# Patient Record
Sex: FEMALE | Race: WHITE | NOT HISPANIC OR LATINO | URBAN - METROPOLITAN AREA
[De-identification: names, ages, dates, MRNs, and addresses within clinical notes are randomized per-mention and may not be internally consistent; named-entity substitution may affect disease eponyms.]

---

## 2017-12-24 ENCOUNTER — HOSPITAL ENCOUNTER (EMERGENCY)
Facility: OTHER | Age: 67
Discharge: HOME | End: 2017-12-24
Attending: EMERGENCY MEDICINE
Payer: MEDICARE

## 2017-12-24 VITALS
HEIGHT: 64 IN | BODY MASS INDEX: 28.17 KG/M2 | OXYGEN SATURATION: 99 % | SYSTOLIC BLOOD PRESSURE: 145 MMHG | WEIGHT: 165 LBS | TEMPERATURE: 98 F | DIASTOLIC BLOOD PRESSURE: 74 MMHG | RESPIRATION RATE: 18 BRPM | HEART RATE: 75 BPM

## 2017-12-24 DIAGNOSIS — S05.01XA INJ CONJUNCTIVA AND CORNEAL ABRASION W/O FB, RIGHT EYE, INIT: Primary | ICD-10-CM

## 2017-12-24 PROCEDURE — 99283 EMERGENCY DEPT VISIT LOW MDM: CPT

## 2017-12-24 PROCEDURE — 25000003 PHARM REV CODE 250: Performed by: EMERGENCY MEDICINE

## 2017-12-24 RX ORDER — PROPARACAINE HYDROCHLORIDE 5 MG/ML
1 SOLUTION/ DROPS OPHTHALMIC
Status: COMPLETED | OUTPATIENT
Start: 2017-12-24 | End: 2017-12-24

## 2017-12-24 RX ORDER — MOXIFLOXACIN 5 MG/ML
1 SOLUTION/ DROPS OPHTHALMIC
Status: COMPLETED | OUTPATIENT
Start: 2017-12-24 | End: 2017-12-24

## 2017-12-24 RX ORDER — MOXIFLOXACIN 5 MG/ML
1 SOLUTION/ DROPS OPHTHALMIC 3 TIMES DAILY
Qty: 1.4 ML | Refills: 0 | Status: SHIPPED | OUTPATIENT
Start: 2017-12-24 | End: 2017-12-31

## 2017-12-24 RX ADMIN — MOXIFLOXACIN HYDROCHLORIDE 1 DROP: 5 SOLUTION/ DROPS OPHTHALMIC at 08:12

## 2017-12-24 RX ADMIN — FLUORESCEIN SODIUM 1 STRIP: 1 STRIP OPHTHALMIC at 06:12

## 2017-12-24 RX ADMIN — PROPARACAINE HYDROCHLORIDE 1 DROP: 5 SOLUTION/ DROPS OPHTHALMIC at 06:12

## 2017-12-25 NOTE — ED PROVIDER NOTES
Encounter Date: 12/24/2017       History     Chief Complaint   Patient presents with    Eye Pain     reports R eye pain since yesterday, pt reports feeling like something was in her eye and was unable to get it out.     History of cataract repair and glaucoma treated with shunt.         Eye Pain    This is a new problem. The current episode started yesterday. The problem occurs constantly. The problem has been unchanged. The right eye is affected. The injury mechanism is unknown. There is no history of trauma to the eye. There is no known exposure to pink eye. She does not wear contacts. Associated symptoms include foreign body sensation. Pertinent negatives include no numbness, no blurred vision, no decreased vision, no discharge, no photophobia, no eye redness, no nausea, no vomiting and no weakness. She has tried water for the symptoms. The treatment provided no relief.   Visiting from [facility]. Will follow up with her PCP when she returns home to check tetanus status. Refused tetanus booster today because she suspects she is current. Will also follow up with her eye doctor on Tuesday for repeat eye exam.   Review of patient's allergies indicates:   Allergen Reactions    Codeine      History reviewed. No pertinent past medical history.  Past Surgical History:   Procedure Laterality Date    CATARACT EXTRACTION       History reviewed. No pertinent family history.  Social History   Substance Use Topics    Smoking status: Never Smoker    Smokeless tobacco: Never Used    Alcohol use No     Review of Systems   Eyes: Positive for pain. Negative for blurred vision, photophobia, discharge, redness, itching and visual disturbance.   Gastrointestinal: Negative for nausea and vomiting.   Neurological: Negative for weakness and numbness.   All other systems reviewed and are negative.      Physical Exam     Initial Vitals [12/24/17 1735]   BP Pulse Resp Temp SpO2   (!) 152/77 73 20 98.4 °F (36.9 °C) 97 %       MAP       102         Physical Exam    Nursing note and vitals reviewed.  Constitutional: She appears well-developed and well-nourished. She is not diaphoretic. No distress.   HENT:   Head: Normocephalic and atraumatic.   Eyes: Conjunctivae are normal. Pupils are equal, round, and reactive to light. Right eye exhibits no chemosis, no discharge and no exudate. No foreign body present in the right eye. Left eye exhibits no chemosis, no discharge and no exudate. No foreign body present in the left eye. Right conjunctiva is not injected. Right conjunctiva has no hemorrhage. Left conjunctiva is not injected. Left conjunctiva has no hemorrhage. Right eye exhibits normal extraocular motion and no nystagmus. Left eye exhibits normal extraocular motion and no nystagmus.   Slit lamp exam:       The right eye shows corneal abrasion and fluorescein uptake. The right eye shows no foreign body and no anterior chamber bulge.        The left eye shows no corneal abrasion, no foreign body, no fluorescein uptake and no anterior chamber bulge.       Right eye pressure 13  Left eye pressure 19    VA 20/30 right and 20/20 left   Neck: Normal range of motion. Neck supple.   Cardiovascular: Normal rate and intact distal pulses.   Pulmonary/Chest: No accessory muscle usage. No tachypnea. No respiratory distress.   Abdominal: She exhibits no distension. There is no tenderness.   Musculoskeletal: Normal range of motion. She exhibits no tenderness.   Neurological: She is alert and oriented to person, place, and time.   Skin: Skin is warm. Capillary refill takes less than 2 seconds.   Psychiatric: She has a normal mood and affect.         ED Course   Procedures  Labs Reviewed - No data to display          Medical Decision Making:   ED Management:  Visiting from [facility]. Will follow up with her PCP when she returns home to check tetanus status. Refused tetanus booster today because she suspects she is current. Will also follow up  with her eye doctor on Tuesday for repeat eye exam.                    ED Course        Medications given in ED    Medications   moxifloxacin 0.5 % ophthalmic solution 1 drop (not administered)   proparacaine 0.5 % ophthalmic solution 1 drop (1 drop Right Eye Given by Other 12/24/17 1842)   fluorescein ophthalmic strip 1 each (1 strip Right Eye Given by Other 12/24/17 1842)       Discharge Medications     Medication List with Changes/Refills   New Medications    MOXIFLOXACIN (VIGAMOX) 0.5 % OPHTHALMIC SOLUTION    Place 1 drop into the right eye 3 (three) times daily.             Patient discharged to home in stable condition with instructions to:   1. Please take all meds as prescribed.  2. Follow-up with your primary care doctor   3. Return precautions discussed and patient and/or family/caretaker understands to return to the emergency room for any concerns including worsening of your current symptoms, fever, chills, night sweats, worsening pain, chest pain, shortness of breath, nausea, vomiting, diarrhea, bleeding, headache, difficulty talking, visual disturbances, weakness, numbness or any other acute concerns    Note was created using voice recognition software. Note may have occasional typographical errors that may not have been identified and edited despite good cari initial review prior to signing.    Clinical Impression:   The encounter diagnosis was Inj conjunctiva and corneal abrasion w/o fb, right eye, init.                           Romeo Egan MD  12/24/17 2016